# Patient Record
(demographics unavailable — no encounter records)

---

## 2024-12-18 NOTE — HISTORY OF PRESENT ILLNESS
[FreeTextEntry1] : Nichole Alonzo is a 51 y.o. female, never smoker, who is here for evaluation with non-ischemic cardiomyopathy. PMHX: HLD, s/p CATH Diagnosed with systolic HF likely secondary to a viral infection 02/2024, HFrEF, non-ischemic cardiomyopathy. Patient's EF was reducing despite GDMT medication, so a Medtronic aICD was implanted on December 2nd at [facility] and had a problem with implanting LV lead, could not access CS, so patient was referred to Dr. Vieyra for possible surgical placement. LHC: 01/30/24: normal coronaries. EF 20%. Cardiac MRI 03/25/24: EF 18%. TTE 11/14/24, EF 18%  Independant, ECOG 0 Worked at [facility]  Health care team as follows: PMD: Dr. Crawford EP: Dr. Parker Cardio: Dr. Burgess

## 2024-12-18 NOTE — DATA REVIEWED
[FreeTextEntry1] : S/P [facility] 01/30/24/ Care Everywhere Procedures: LEFT HEART CATH, CORONARY ANGIOGRAPHY (WITH OR WITHOUT LVGRAM) US VASCULAR ACCESS [id number] ________________________________________________________________________  Impressions: Normal coronaries. Abnormal LV function. EF calculated by contrast ventriculography was 20 %. Recommendations: Follow up with primary cardiologist. Risk factor modification  Cardiac MRI.  History: Heart failure.  Technique: Precontrast multi-planar multisequence anatomic imaging. First pass perfusion imaging. Post contrast MDE. Multiplanar cardiac gated FIESTA myocardial motion study.  Findings:  The left ventricle appears dilated. There is no evidence of focal myocardial thickening or thinning. There is generalized hypokinesia. Left ventricular ejection fraction is decreased, calculated at 18%.  Postcontrast imaging shows no evidence of pathologic delayed left ventricular myocardial contrast enhancement. There is no evidence of left ventricular myocardial infiltrative process or mass. There is no evidence of right ventricular myocardial fibrofatty replacement. There is no evidence of coronary artery territory defect on dynamic resting perfusion evaluation.  There is no evidence of pericardial effusion. The aortic, mitral, and tricuspid valves demonstrate normal thickness and motion without evidence of significant regurgitation. There is no evidence of ascending aortic aneurysm.

## 2024-12-18 NOTE — ASSESSMENT
[FreeTextEntry1] :  Plan: - ICD implanted on Dec 2, unable to access CS for LV lead implantation  - Implantation of left ventricular pacing electrode possible left thoracotomy - Patient needs chest xray, PAST, ct chest, TTE - Planned surgery date: 1/14/25  I explained the procedure in detail, including risks, benefits and alternatives, and the patient agreed to proceed with surgery by signing informed consent forms. Surgery will be scheduled and will proceed as soon as presurgical testing is completed.  -FMR 18

## 2024-12-18 NOTE — PHYSICAL EXAM
[General Appearance - Alert] : alert [General Appearance - In No Acute Distress] : in no acute distress [Sclera] : the sclera and conjunctiva were normal [PERRL With Normal Accommodation] : pupils were equal in size, round, and reactive to light [Extraocular Movements] : extraocular movements were intact [Outer Ear] : the ears and nose were normal in appearance [Oropharynx] : the oropharynx was normal [Neck Appearance] : the appearance of the neck was normal [Neck Cervical Mass (___cm)] : no neck mass was observed [Jugular Venous Distention Increased] : there was no jugular-venous distention [Thyroid Diffuse Enlargement] : the thyroid was not enlarged [Thyroid Nodule] : there were no palpable thyroid nodules [Auscultation Breath Sounds / Voice Sounds] : lungs were clear to auscultation bilaterally [Heart Rate And Rhythm] : heart rate was normal and rhythm regular [Heart Sounds] : normal S1 and S2 [Heart Sounds Gallop] : no gallops [Murmurs] : no murmurs [Heart Sounds Pericardial Friction Rub] : no pericardial rub [Bowel Sounds] : normal bowel sounds [Abdomen Soft] : soft [Abdomen Tenderness] : non-tender [Abdomen Mass (___ Cm)] : no abdominal mass palpated [Abnormal Walk] : normal gait [Nail Clubbing] : no clubbing  or cyanosis of the fingernails [Musculoskeletal - Swelling] : no joint swelling seen [Motor Tone] : muscle strength and tone were normal [Skin Color & Pigmentation] : normal skin color and pigmentation [Skin Turgor] : normal skin turgor [] : no rash [Deep Tendon Reflexes (DTR)] : deep tendon reflexes were 2+ and symmetric [Sensation] : the sensory exam was normal to light touch and pinprick [No Focal Deficits] : no focal deficits [Oriented To Time, Place, And Person] : oriented to person, place, and time [Impaired Insight] : insight and judgment were intact [Affect] : the affect was normal [FreeTextEntry1] : No LE edema noted [Over the Past 2 Weeks, Have You Felt Down, Depressed, or Hopeless?] : 1.) Over the past 2 weeks, have you felt down, depressed, or hopeless? No [Over the Past 2 Weeks, Have You Felt Little Interest or Pleasure Doing Things?] : 2.) Over the past 2 weeks, have you felt little interest or pleasure doing things? No

## 2024-12-18 NOTE — ASSESSMENT
[FreeTextEntry1] :  Plan: - ICD implanted on Dec 2, unable to access CS for LV lead implantation  - Implantation of left ventricular pacing electrode possible left thoracotomy - Patient needs chest xray, PAST, ct chest, TTE - Planned surgery date: 1/14/25  I explained the procedure in detail, including risks, benefits and alternatives, and the patient agreed to proceed with surgery by signing informed consent forms. Surgery will be scheduled and will proceed as soon as presurgical testing is completed.  -FMR

## 2024-12-18 NOTE — DATA REVIEWED
[FreeTextEntry1] : S/P [facility] 01/30/24/ Care Everywhere Procedures: LEFT HEART CATH, CORONARY ANGIOGRAPHY (WITH OR WITHOUT LVGRAM) US VASCULAR ACCESS [id number] ________________________________________________________________________  Impressions: Normal coronaries. Abnormal LV function. EF calculated by contrast ventriculography was 20 %. Recommendations: Follow up with primary cardiologist. Risk factor modification  Cardiac MRI.  History: Heart failure.  Technique: Precontrast multi-planar multisequence anatomic imaging. First pass perfusion imaging. Post contrast MDE. Multiplanar cardiac gated FIESTA myocardial motion study.  Findings:  The left ventricle appears dilated. There is no evidence of focal myocardial thickening or thinning. There is generalized hypokinesia. Left ventricular ejection fraction is decreased, calculated at 18%.  Postcontrast imaging shows no evidence of pathologic delayed left ventricular myocardial contrast enhancement. There is no evidence of left ventricular myocardial infiltrative process or mass. There is no evidence of right ventricular myocardial fibrofatty replacement. There is no evidence of coronary artery territory defect on dynamic resting perfusion evaluation.  There is no evidence of pericardial effusion. The aortic, mitral, and tricuspid valves demonstrate normal thickness and motion without evidence of significant regurgitation. There is no evidence of ascending aortic aneurysm.

## 2024-12-18 NOTE — HISTORY OF PRESENT ILLNESS
[FreeTextEntry1] : Nichole Alonzo is a 51 y.o. female, never smoker, who is here for evaluation with non-ischemic cardiomyopathy. PMHX: HLD, s/p CATH Diagnosed with systolic HF likely secondary to a viral infection 02/2024, HFrEF, non-ischemic cardiomyopathy. Patient's EF was reducing despite GDMT medication, so a Medtronic aICD was implanted on December 2nd at [facility] and had a problem with implanting LV lead, could not access CS, so patient was referred to Dr. Vieyra for possible surgical placement. LHC: 01/30/24: normal coronaries. EF 20%. Cardiac MRI 03/25/24: EF 18%. TTE 11/14/24, EF 18%  Independant, ECOG 0 Worked at [employer]  Health care team as follows: PMD: Dr. Crawford EP: Dr. Parker Cardio: Dr. Burgess

## 2024-12-18 NOTE — DATA REVIEWED
[FreeTextEntry1] : S/P [facility] 01/30/24/ Care Everywhere Procedures: LEFT HEART CATH, CORONARY ANGIOGRAPHY (WITH OR WITHOUT LVGRAM) US VASCULAR ACCESS [id number] ________________________________________________________________________  Impressions: Normal coronaries. Abnormal LV function. EF calculated by contrast ventriculography was 20 %. Recommendations: Follow up with primary cardiologist. Risk factor modification  Cardiac MRI.  History: Heart failure.  Technique: Precontrast multi-planar multisequence anatomic imaging. First pass perfusion imaging. Post contrast MDE. Multiplanar cardiac gated FIESTA myocardial motion study.  Findings:  The left ventricle appears dilated. There is no evidence of focal myocardial thickening or thinning. There is generalized hypokinesia. Left ventricular ejection fraction is decreased, calculated at 18%.  Postcontrast imaging shows no evidence of pathologic delayed left ventricular myocardial contrast enhancement. There is no evidence of left ventricular myocardial infiltrative process or mass. There is no evidence of right ventricular myocardial fibrofatty replacement. There is no evidence of coronary artery territory defect on dynamic resting perfusion evaluation.  There is no evidence of pericardial effusion. The aortic, mitral, and tricuspid valves demonstrate normal thickness and motion without evidence of significant regurgitation. There is no evidence of ascending aortic aneurysm.

## 2025-01-22 NOTE — ASSESSMENT
[FreeTextEntry1] : 51 y.o. female, never smoker, s/p  lead revision and generator upgrade.   PMHX: HLD, s/p CATH Diagnosed with systolic HF likely secondary to a viral infection 02/2024, HFrEF, non-ischemic cardiomyopathy. Patient's EF was reducing despite GDMT medication, so a Medtronic aICD was implanted on December 2nd at [facility] and had a problem with implanting LV lead, could not access CS, so patient was referred to Dr. Vieyra for possible surgical placement.   Postoperatively, patient tolerated the procedure well. No significant events overnight. Pressure dressing removed this AM. Device interrogated, as per EP, device functioning appropriately. Here today for post op check.

## 2025-01-22 NOTE — ASSESSMENT
[FreeTextEntry1] : 51 y.o. female, never smoker, s/p  lead revision and generator upgrade.   PMHX: HLD, s/p CATH Diagnosed with systolic HF likely secondary to a viral infection 02/2024, HFrEF, non-ischemic cardiomyopathy. Patient's EF was reducing despite GDMT medication, so a Medtronic aICD was implanted on December 2nd at [facility] and had a problem with implanting LV lead, could not access CS, so patient was referred to Dr. Vieyra for possible surgical placement.   Postoperatively, patient tolerated the procedure well. No significant events overnight. Pressure dressing removed this AM. Device interrogated, as per EP, device functioning appropriately. Here today for post op check.

## 2025-01-22 NOTE — COUNSELING
[FreeTextEntry1] : ELISEO RODRIGUEZ is a 51 year F that arrives today s/p device implant. They were educated on site care. Incision site is to be washed daily with soap and water, pat dry. NO lotions, perfumes, or ointments to be applied to the incision site until completely healed, usual time frame is 3 weeks. On arrival patient denies fever, chills nausea, and vomiting. Denies SOB or palpitation. On initial implantation restrictions are 6 weeks of no lifting, pushing pulling anything 10lbs or greater. Also the patient is not allowed to lift their arm above heart level for 6 weeks. No driving for 4 weeks.Patient was instructed to follow up with their electrophysiologist as well as their cardiologist within the next 2-4 weeks.